# Patient Record
Sex: FEMALE | Race: WHITE | NOT HISPANIC OR LATINO | Employment: UNEMPLOYED | URBAN - METROPOLITAN AREA
[De-identification: names, ages, dates, MRNs, and addresses within clinical notes are randomized per-mention and may not be internally consistent; named-entity substitution may affect disease eponyms.]

---

## 2019-10-20 ENCOUNTER — HOSPITAL ENCOUNTER (OUTPATIENT)
Dept: URGENT CARE | Facility: CLINIC | Age: 44
Discharge: HOME | End: 2019-10-20
Attending: FAMILY MEDICINE

## 2019-10-22 LAB — BACTERIA SPEC AEROBE CULT: NORMAL

## 2021-08-02 PROCEDURE — U0003 INFECTIOUS AGENT DETECTION BY NUCLEIC ACID (DNA OR RNA); SEVERE ACUTE RESPIRATORY SYNDROME CORONAVIRUS 2 (SARS-COV-2) (CORONAVIRUS DISEASE [COVID-19]), AMPLIFIED PROBE TECHNIQUE, MAKING USE OF HIGH THROUGHPUT TECHNOLOGIES AS DESCRIBED BY CMS-2020-01-R: HCPCS | Performed by: NURSE PRACTITIONER

## 2021-08-27 ENCOUNTER — HOSPITAL ENCOUNTER (EMERGENCY)
Facility: HOSPITAL | Age: 46
Discharge: HOME | End: 2021-08-27
Attending: EMERGENCY MEDICINE | Admitting: EMERGENCY MEDICINE

## 2021-08-27 VITALS
RESPIRATION RATE: 24 BRPM | DIASTOLIC BLOOD PRESSURE: 93 MMHG | HEART RATE: 90 BPM | BODY MASS INDEX: 36.32 KG/M2 | OXYGEN SATURATION: 98 % | WEIGHT: 212.74 LBS | TEMPERATURE: 97.8 F | SYSTOLIC BLOOD PRESSURE: 149 MMHG | HEIGHT: 64 IN

## 2021-08-27 DIAGNOSIS — I47.1 PSVT (PAROXYSMAL SUPRAVENTRICULAR TACHYCARDIA) (HCC): Primary | ICD-10-CM

## 2021-08-27 LAB
ALBUMIN SERPL-MCNC: 4.4 G/DL (ref 3.5–5.2)
ALBUMIN/GLOB SERPL: 1.9 G/DL
ALP SERPL-CCNC: 96 U/L (ref 39–117)
ALT SERPL W P-5'-P-CCNC: 42 U/L (ref 1–33)
ANION GAP SERPL CALCULATED.3IONS-SCNC: 12 MMOL/L (ref 5–15)
AST SERPL-CCNC: 53 U/L (ref 1–32)
BASOPHILS # BLD AUTO: 0.13 10*3/MM3 (ref 0–0.2)
BASOPHILS NFR BLD AUTO: 1.5 % (ref 0–1.5)
BILIRUB SERPL-MCNC: 0.3 MG/DL (ref 0–1.2)
BUN SERPL-MCNC: 15 MG/DL (ref 6–20)
BUN/CREAT SERPL: 16.3 (ref 7–25)
CALCIUM SPEC-SCNC: 9.1 MG/DL (ref 8.6–10.5)
CHLORIDE SERPL-SCNC: 107 MMOL/L (ref 98–107)
CO2 SERPL-SCNC: 21 MMOL/L (ref 22–29)
CREAT SERPL-MCNC: 0.92 MG/DL (ref 0.57–1)
DEPRECATED RDW RBC AUTO: 50.5 FL (ref 37–54)
EOSINOPHIL # BLD AUTO: 0.12 10*3/MM3 (ref 0–0.4)
EOSINOPHIL NFR BLD AUTO: 1.4 % (ref 0.3–6.2)
ERYTHROCYTE [DISTWIDTH] IN BLOOD BY AUTOMATED COUNT: 18.6 % (ref 12.3–15.4)
GFR SERPL CREATININE-BSD FRML MDRD: 65 ML/MIN/1.73
GLOBULIN UR ELPH-MCNC: 2.3 GM/DL
GLUCOSE SERPL-MCNC: 130 MG/DL (ref 65–99)
HCT VFR BLD AUTO: 36.1 % (ref 34–46.6)
HGB BLD-MCNC: 10.7 G/DL (ref 12–15.9)
HOLD SPECIMEN: NORMAL
HOLD SPECIMEN: NORMAL
IMM GRANULOCYTES # BLD AUTO: 0.04 10*3/MM3 (ref 0–0.05)
IMM GRANULOCYTES NFR BLD AUTO: 0.5 % (ref 0–0.5)
LYMPHOCYTES # BLD AUTO: 1.51 10*3/MM3 (ref 0.7–3.1)
LYMPHOCYTES NFR BLD AUTO: 17.2 % (ref 19.6–45.3)
MCH RBC QN AUTO: 22.7 PG (ref 26.6–33)
MCHC RBC AUTO-ENTMCNC: 29.6 G/DL (ref 31.5–35.7)
MCV RBC AUTO: 76.6 FL (ref 79–97)
MONOCYTES # BLD AUTO: 0.69 10*3/MM3 (ref 0.1–0.9)
MONOCYTES NFR BLD AUTO: 7.9 % (ref 5–12)
NEUTROPHILS NFR BLD AUTO: 6.27 10*3/MM3 (ref 1.7–7)
NEUTROPHILS NFR BLD AUTO: 71.5 % (ref 42.7–76)
NRBC BLD AUTO-RTO: 0 /100 WBC (ref 0–0.2)
NT-PROBNP SERPL-MCNC: 921.3 PG/ML (ref 0–450)
PLATELET # BLD AUTO: 283 10*3/MM3 (ref 140–450)
PMV BLD AUTO: 11 FL (ref 6–12)
POTASSIUM SERPL-SCNC: 4.1 MMOL/L (ref 3.5–5.2)
PROT SERPL-MCNC: 6.7 G/DL (ref 6–8.5)
RBC # BLD AUTO: 4.71 10*6/MM3 (ref 3.77–5.28)
SODIUM SERPL-SCNC: 140 MMOL/L (ref 136–145)
TROPONIN T SERPL-MCNC: <0.01 NG/ML (ref 0–0.03)
WBC # BLD AUTO: 8.76 10*3/MM3 (ref 3.4–10.8)
WHOLE BLOOD HOLD SPECIMEN: NORMAL
WHOLE BLOOD HOLD SPECIMEN: NORMAL

## 2021-08-27 PROCEDURE — 25010000002 ADENOSINE PER 6 MG: Performed by: EMERGENCY MEDICINE

## 2021-08-27 PROCEDURE — 96374 THER/PROPH/DIAG INJ IV PUSH: CPT

## 2021-08-27 PROCEDURE — 25010000002 ADENOSINE PER 6 MG

## 2021-08-27 PROCEDURE — 99283 EMERGENCY DEPT VISIT LOW MDM: CPT

## 2021-08-27 PROCEDURE — 80053 COMPREHEN METABOLIC PANEL: CPT | Performed by: EMERGENCY MEDICINE

## 2021-08-27 PROCEDURE — 83880 ASSAY OF NATRIURETIC PEPTIDE: CPT | Performed by: EMERGENCY MEDICINE

## 2021-08-27 PROCEDURE — 85025 COMPLETE CBC W/AUTO DIFF WBC: CPT | Performed by: EMERGENCY MEDICINE

## 2021-08-27 PROCEDURE — 93005 ELECTROCARDIOGRAM TRACING: CPT | Performed by: EMERGENCY MEDICINE

## 2021-08-27 PROCEDURE — 84484 ASSAY OF TROPONIN QUANT: CPT | Performed by: EMERGENCY MEDICINE

## 2021-08-27 RX ORDER — SODIUM CHLORIDE 0.9 % (FLUSH) 0.9 %
10 SYRINGE (ML) INJECTION AS NEEDED
Status: DISCONTINUED | OUTPATIENT
Start: 2021-08-27 | End: 2021-08-27 | Stop reason: HOSPADM

## 2021-08-27 RX ORDER — ADENOSINE 3 MG/ML
6 INJECTION, SOLUTION INTRAVENOUS ONCE
Status: COMPLETED | OUTPATIENT
Start: 2021-08-27 | End: 2021-08-27

## 2021-08-27 RX ORDER — ADENOSINE 3 MG/ML
12 INJECTION, SOLUTION INTRAVENOUS ONCE
Status: COMPLETED | OUTPATIENT
Start: 2021-08-27 | End: 2021-08-27

## 2021-08-27 RX ORDER — ADENOSINE 3 MG/ML
INJECTION, SOLUTION INTRAVENOUS
Status: COMPLETED
Start: 2021-08-27 | End: 2021-08-27

## 2021-08-27 RX ORDER — METOPROLOL SUCCINATE 25 MG/1
25 TABLET, EXTENDED RELEASE ORAL DAILY
Qty: 30 TABLET | Refills: 2 | Status: SHIPPED | OUTPATIENT
Start: 2021-08-27 | End: 2022-12-19 | Stop reason: DRUGHIGH

## 2021-08-27 RX ADMIN — ADENOSINE 6 MG: 3 INJECTION INTRAVENOUS at 15:24

## 2021-08-27 RX ADMIN — ADENOSINE 6 MG: 3 INJECTION, SOLUTION INTRAVENOUS at 15:24

## 2021-08-27 RX ADMIN — ADENOSINE 12 MG: 3 INJECTION INTRAVENOUS at 15:28

## 2021-08-28 LAB — QT INTERVAL: 318 MS

## 2022-12-19 ENCOUNTER — OFFICE VISIT (OUTPATIENT)
Dept: CARDIOLOGY | Facility: CLINIC | Age: 47
End: 2022-12-19

## 2022-12-19 VITALS
SYSTOLIC BLOOD PRESSURE: 134 MMHG | DIASTOLIC BLOOD PRESSURE: 70 MMHG | WEIGHT: 228 LBS | HEART RATE: 90 BPM | HEIGHT: 64 IN | BODY MASS INDEX: 38.93 KG/M2

## 2022-12-19 DIAGNOSIS — I50.22 SYSTOLIC CHF, CHRONIC: ICD-10-CM

## 2022-12-19 DIAGNOSIS — I42.0 NONISCHEMIC DILATED CARDIOMYOPATHY: Primary | ICD-10-CM

## 2022-12-19 PROCEDURE — 99203 OFFICE O/P NEW LOW 30 MIN: CPT | Performed by: SPECIALIST

## 2022-12-19 RX ORDER — SPIRONOLACTONE 25 MG/1
25 TABLET ORAL DAILY
Qty: 90 TABLET | Refills: 5 | Status: SHIPPED | OUTPATIENT
Start: 2022-12-19

## 2022-12-19 RX ORDER — CARVEDILOL 6.25 MG/1
6.25 TABLET ORAL 2 TIMES DAILY
COMMUNITY
Start: 2022-12-13 | End: 2022-12-19 | Stop reason: SDUPTHER

## 2022-12-19 RX ORDER — SACUBITRIL AND VALSARTAN 24; 26 MG/1; MG/1
1 TABLET, FILM COATED ORAL 2 TIMES DAILY
Qty: 60 TABLET | Refills: 8 | Status: SHIPPED | OUTPATIENT
Start: 2022-12-19

## 2022-12-19 RX ORDER — FUROSEMIDE 40 MG/1
40 TABLET ORAL DAILY
COMMUNITY
Start: 2022-12-13

## 2022-12-19 RX ORDER — SPIRONOLACTONE 25 MG/1
25 TABLET ORAL DAILY
COMMUNITY
Start: 2022-12-13 | End: 2022-12-19 | Stop reason: SDUPTHER

## 2022-12-19 RX ORDER — SACUBITRIL AND VALSARTAN 24; 26 MG/1; MG/1
1 TABLET, FILM COATED ORAL 2 TIMES DAILY
COMMUNITY
Start: 2022-12-13 | End: 2022-12-19 | Stop reason: SDUPTHER

## 2022-12-19 RX ORDER — CARVEDILOL 6.25 MG/1
6.25 TABLET ORAL 2 TIMES DAILY
Qty: 180 TABLET | Refills: 8 | Status: SHIPPED | OUTPATIENT
Start: 2022-12-19

## 2022-12-19 NOTE — PROGRESS NOTES
[facility]   Cardiology Consult Note    Patient Name: Cristy Linda  : 1975  Referring Physician: Freddy Aguero MD  Subjective   Subjective     Reason for Consult/ Chief Complaint:   Chief Complaint   Patient presents with   • Shortness of Breath       HPI:  Cristy Linda is a 47 y.o. female with history of nonischemic cardiomyopathy and CHF was seeing a cardiologist in [place].  Wants to establish care with me.  No chest pain.  Shortness of breath present.  Not compliant with her medications.    Review of Systems:    Constitutional no fever,  no weight loss   Skin no rash   Otolaryngeal no difficulty swallowing   Cardiovascular See HPI   Pulmonary no cough, no sputum production   Gastrointestinal no constipation, no diarrhea   Genitourinary no dysuria, no hematuria   Hematologic no easy bruisability, no abnormal bleeding   Musculoskeletal no muscle pain   Neurologic no dizziness, no falls       Personal History     Past Medical History:  Past Medical History:   Diagnosis Date   • Hypertension    • Migraine    • SVT (supraventricular tachycardia) (HCC)        Family History:   Family History   Problem Relation Age of Onset   • No Known Problems Mother    • Hypertension Father    • Heart failure Father        Social History:  reports that she has never smoked. She has never used smokeless tobacco. She reports that she does not drink alcohol and does not use drugs.    Home Medications:  carvedilol, furosemide, sacubitril-valsartan, and spironolactone    Allergies:  Allergies   Allergen Reactions   • Penicillins Hives   • Metoprolol GI Intolerance       Objective    Objective     Vitals:   Heart Rate:  [90] 90  BP: (134)/(70) 134/70  Body mass index is 39.14 kg/m².  PHYSICAL EXAM:    General Appearance:   · well developed  · well nourished  HENT:   · oropharynx moist  · lips not cyanotic  Neck:  · thyroid not enlarged  · supple  Respiratory:  · no respiratory distress  · normal breath  sounds  · no rales  Cardiovascular:  · no jugular venous distention  · regular rhythm  · apical impulse normal  · S1 normal, S2 normal  · no S3, no S4   · no murmur  · no rub, no thrill  · carotid pulses normal; no bruit  · pedal pulses normal  · lower extremity edema: none    Skin:   · warm, dry  Psychiatric:  · judgement and insight appropriate  · normal mood and affect    RESULTS:    EKG reviewed by me and shows sinus rhythm.       Result Review    Result Review:  I have personally reviewed the available results:  [x]  Laboratory  [x]  EKG/Telemetry   [x]  Cardiology/Vascular   [x] Medications  [x]  Old records      Procedures     Impression/Plan  1. Nonischemic cardiomyopathy: Last echocardiogram in July showed an ejection fraction of 39%.  Compliance with medication advised.  Risk benefits explained.  Continue carvedilol 6.25 mg twice a day.  Restart Entresto 24/26 mg twice a day.  Repeat echocardiogram in 3 months.  2.  Chronic systolic heart failure stable: Continue Lasix 40 mg a day.  Advised to take an extra dose with worsening shortness of breath.  Continue Aldactone 25 mg a day.  Check BMP and BNP in 2 weeks.  Low-salt diet and fluid restriction advised        Electronically signed by Scott Perez MD, 12/19/22, 12:48 PM EST.

## 2022-12-19 NOTE — PATIENT INSTRUCTIONS
"Low-Sodium Eating Plan  Sodium, which is an element that makes up salt, helps you maintain a healthy balance of fluids in your body. Too much sodium can increase your blood pressure and cause fluid and waste to be held in your body.  Your health care provider or dietitian may recommend following this plan if you have high blood pressure (hypertension), kidney disease, liver disease, or heart failure. Eating less sodium can help lower your blood pressure, reduce swelling, and protect your heart, liver, and kidneys.  What are tips for following this plan?  Reading food labels  The Nutrition Facts label lists the amount of sodium in one serving of the food. If you eat more than one serving, you must multiply the listed amount of sodium by the number of servings.  Choose foods with less than 140 mg of sodium per serving.  Avoid foods with 300 mg of sodium or more per serving.  Shopping    Look for lower-sodium products, often labeled as \"low-sodium\" or \"no salt added.\"  Always check the sodium content, even if foods are labeled as \"unsalted\" or \"no salt added.\"  Buy fresh foods.  Avoid canned foods and pre-made or frozen meals.  Avoid canned, cured, or processed meats.  Buy breads that have less than 80 mg of sodium per slice.  Cooking    Eat more home-cooked food and less restaurant, buffet, and fast food.  Avoid adding salt when cooking. Use salt-free seasonings or herbs instead of table salt or sea salt. Check with your health care provider or pharmacist before using salt substitutes.  Cook with plant-based oils, such as canola, sunflower, or olive oil.  Meal planning  When eating at a restaurant, ask that your food be prepared with less salt or no salt, if possible. Avoid dishes labeled as brined, pickled, cured, smoked, or made with soy sauce, miso, or teriyaki sauce.  Avoid foods that contain MSG (monosodium glutamate). MSG is sometimes added to Chinese food, bouillon, and some canned foods.  Make meals that can " "be grilled, baked, poached, roasted, or steamed. These are generally made with less sodium.  General information  Most people on this plan should limit their sodium intake to 1,500-2,000 mg (milligrams) of sodium each day.  What foods should I eat?  Fruits  Fresh, frozen, or canned fruit. Fruit juice.  Vegetables  Fresh or frozen vegetables. \"No salt added\" canned vegetables. \"No salt added\" tomato sauce and paste. Low-sodium or reduced-sodium tomato and vegetable juice.  Grains  Low-sodium cereals, including oats, puffed wheat and rice, and shredded wheat. Low-sodium crackers. Unsalted rice. Unsalted pasta. Low-sodium bread. Whole-grain breads and whole-grain pasta.  Meats and other proteins  Fresh or frozen (no salt added) meat, poultry, seafood, and fish. Low-sodium canned tuna and salmon. Unsalted nuts. Dried peas, beans, and lentils without added salt. Unsalted canned beans. Eggs. Unsalted nut butters.  Dairy  Milk. Soy milk. Cheese that is naturally low in sodium, such as ricotta cheese, fresh mozzarella, or Swiss cheese. Low-sodium or reduced-sodium cheese. Cream cheese. Yogurt.  Seasonings and condiments  Fresh and dried herbs and spices. Salt-free seasonings. Low-sodium mustard and ketchup. Sodium-free salad dressing. Sodium-free light mayonnaise. Fresh or refrigerated horseradish. Lemon juice. Vinegar.  Other foods  Homemade, reduced-sodium, or low-sodium soups. Unsalted popcorn and pretzels. Low-salt or salt-free chips.  The items listed above may not be a complete list of foods and beverages you can eat. Contact a dietitian for more information.  What foods should I avoid?  Vegetables  Sauerkraut, pickled vegetables, and relishes. Olives. French fries. Onion rings. Regular canned vegetables (not low-sodium or reduced-sodium). Regular canned tomato sauce and paste (not low-sodium or reduced-sodium). Regular tomato and vegetable juice (not low-sodium or reduced-sodium). Frozen vegetables in " sauces.  Grains  Instant hot cereals. Bread stuffing, pancake, and biscuit mixes. Croutons. Seasoned rice or pasta mixes. Noodle soup cups. Boxed or frozen macaroni and cheese. Regular salted crackers. Self-rising flour.  Meats and other proteins  Meat or fish that is salted, canned, smoked, spiced, or pickled. Precooked or cured meat, such as sausages or meat loaves. Garcia. Ham. Pepperoni. Hot dogs. Corned beef. Chipped beef. Salt pork. Jerky. Pickled herring. Anchovies and sardines. Regular canned tuna. Salted nuts.  Dairy  Processed cheese and cheese spreads. Hard cheeses. Cheese curds. Blue cheese. Feta cheese. String cheese. Regular cottage cheese. Buttermilk. Canned milk.  Fats and oils  Salted butter. Regular margarine. Ghee. Garcia fat.  Seasonings and condiments  Onion salt, garlic salt, seasoned salt, table salt, and sea salt. Canned and packaged gravies. Worcestershire sauce. Tartar sauce. Barbecue sauce. Teriyaki sauce. Soy sauce, including reduced-sodium. Steak sauce. Fish sauce. Oyster sauce. Cocktail sauce. Horseradish that you find on the shelf. Regular ketchup and mustard. Meat flavorings and tenderizers. Bouillon cubes. Hot sauce. Pre-made or packaged marinades. Pre-made or packaged taco seasonings. Relishes. Regular salad dressings. Salsa.  Other foods  Salted popcorn and pretzels. Corn chips and puffs. Potato and tortilla chips. Canned or dried soups. Pizza. Frozen entrees and pot pies.  The items listed above may not be a complete list of foods and beverages you should avoid. Contact a dietitian for more information.  Summary  Eating less sodium can help lower your blood pressure, reduce swelling, and protect your heart, liver, and kidneys.  Most people on this plan should limit their sodium intake to 1,500-2,000 mg (milligrams) of sodium each day.  Canned, boxed, and frozen foods are high in sodium. Restaurant foods, fast foods, and pizza are also very high in sodium. You also get sodium by  adding salt to food.  Try to cook at home, eat more fresh fruits and vegetables, and eat less fast food and canned, processed, or prepared foods.  This information is not intended to replace advice given to you by your health care provider. Make sure you discuss any questions you have with your health care provider.  Document Revised: 01/22/2021 Document Reviewed: 11/18/2020  [employer] Patient Education © 2022 [employer]